# Patient Record
Sex: FEMALE | Race: WHITE | NOT HISPANIC OR LATINO | URBAN - METROPOLITAN AREA
[De-identification: names, ages, dates, MRNs, and addresses within clinical notes are randomized per-mention and may not be internally consistent; named-entity substitution may affect disease eponyms.]

---

## 2017-08-02 ENCOUNTER — IMPORTED ENCOUNTER (OUTPATIENT)
Dept: URBAN - METROPOLITAN AREA CLINIC 31 | Facility: CLINIC | Age: 77
End: 2017-08-02

## 2017-08-02 PROBLEM — H35.3131: Noted: 2017-08-02

## 2017-08-02 PROBLEM — H04.123: Noted: 2017-08-02

## 2017-08-02 PROBLEM — Z96.1: Noted: 2017-08-02

## 2017-08-02 PROBLEM — H18.51: Noted: 2017-08-02

## 2017-08-02 PROCEDURE — 92014 COMPRE OPH EXAM EST PT 1/>: CPT

## 2017-08-02 PROCEDURE — 92286 ANT SGM IMG I&R SPECLR MIC: CPT

## 2017-08-02 NOTE — PATIENT DISCUSSION
1.  Fuchs Dystrophy OU: Recommend clinical observation. Advised use of Yuval 128 drops in affected eye if worsening. 2. Pseudophakia OU - IOLs stable. Monitor. 3. ARMD OU dry - Importance of smoking cessation blood pressure control and healthy diet were emphasized. In accordance with the AREDS study a good multivitamin containing EC and Zinc were recommened to be taken daily. Patient was instructed to self monitor their monocular vision (reading/[employer] Grid) at least weekly. Patient should immediately report any new onset of decreased vision or metamorphopsia. 4. Dry Eyes OU:  Start artificials tears. Encouraged regular use. 5.  Return for an appointment in 12 months for comprehensive exam. ECC. with Dr. Tesha Rosa.

## 2018-02-02 ENCOUNTER — IMPORTED ENCOUNTER (OUTPATIENT)
Dept: URBAN - METROPOLITAN AREA CLINIC 31 | Facility: CLINIC | Age: 78
End: 2018-02-02

## 2018-02-02 PROBLEM — Z96.1: Noted: 2018-02-02

## 2018-02-02 PROBLEM — G43.B0: Noted: 2018-02-02

## 2018-02-02 PROBLEM — H18.51: Noted: 2018-02-02

## 2018-02-02 PROCEDURE — 99214 OFFICE O/P EST MOD 30 MIN: CPT

## 2018-02-02 NOTE — PATIENT DISCUSSION
1.  Opthalmic Migraine - Discussed opthalmic migraine auras and possibilty of avoiding triggers. Continue to follow with primary medical provider. 2. Pseudophakia OU - IOLs stable. Monitor. 3. Fuchs Dystrophy OU: Recommend clinical observation. Advised use of Yuval 128 drops in affected eye if worsening. 4. Return for an appointment in 6 months for comprehensive exam. with Dr. Lashanda Trinidad.

## 2018-08-10 ENCOUNTER — IMPORTED ENCOUNTER (OUTPATIENT)
Dept: URBAN - METROPOLITAN AREA CLINIC 31 | Facility: CLINIC | Age: 78
End: 2018-08-10

## 2018-08-10 PROBLEM — Z96.1: Noted: 2018-08-10

## 2018-08-10 PROBLEM — H16.143: Noted: 2018-08-10

## 2018-08-10 PROBLEM — H18.51: Noted: 2018-08-10

## 2018-08-10 PROBLEM — H04.123: Noted: 2018-08-10

## 2018-08-10 PROCEDURE — 92014 COMPRE OPH EXAM EST PT 1/>: CPT

## 2018-08-10 PROCEDURE — 92286 ANT SGM IMG I&R SPECLR MIC: CPT

## 2018-08-10 NOTE — PATIENT DISCUSSION
Fuchs Dystrophy OU: Recommend clinical observation. Cell count worse no clinical edema. T/C Sodium Chloride drops in future.

## 2018-08-10 NOTE — PATIENT DISCUSSION
1.  Pseudophakia OU - IOLs stable. Monitor. 2. Fuchs Dystrophy OU: Recommend clinical observation. Cell count worse no clinical edema. T/C Sodium Chloride drops in future. 3.  Dry Eye OU:  Continue current management with Artificial Tears. 4.  Return for an appointment in 12 months for comprehensive exam. with Dr. Obi Chaudhry.

## 2019-08-27 ENCOUNTER — IMPORTED ENCOUNTER (OUTPATIENT)
Dept: URBAN - METROPOLITAN AREA CLINIC 31 | Facility: CLINIC | Age: 79
End: 2019-08-27

## 2019-08-27 PROBLEM — H18.51: Noted: 2019-08-27

## 2019-08-27 PROBLEM — Z96.1: Noted: 2019-08-27

## 2019-08-27 PROBLEM — H04.123: Noted: 2019-08-27

## 2019-08-27 PROCEDURE — 92015 DETERMINE REFRACTIVE STATE: CPT

## 2019-08-27 PROCEDURE — 92014 COMPRE OPH EXAM EST PT 1/>: CPT

## 2019-08-27 NOTE — PATIENT DISCUSSION
1.  Dry Eye OU:  Continue current management with Artificial Tears. 2.  Pseudophakia OU - IOLs stable. Monitor. 3. Fuchs Dystrophy OU: Recommend clinical observation. Advised use of Yuval 128 drops in affected eye if worsening. Moving to [place]. Return for an appointment in 12 months for comprehensive exam. with Dr. Chris Espinoza.

## 2019-08-27 NOTE — PATIENT DISCUSSION
Fuchs Dystrophy OU: Recommend clinical observation. Advised use of Yuval 128 drops in affected eye if worsening.

## 2019-08-27 NOTE — PATIENT DISCUSSION
1.  Pseudophakia OU - IOLs stable. Monitor. 2. Fuchs Dystrophy OU: Recommend clinical observation. No clinical edema. T/C Sodium Chloride drops in future. 3.  Dry Eye OU:  Continue current management with Artificial Tears. 4.  Return for an appointment in 12 months for comprehensive exam.and ECC with Dr. Denver Curb.

## 2022-04-02 ASSESSMENT — VISUAL ACUITY
OS_CC: 20/40-2
OS_PH: SC 20/30 -2
OS_CC: 20/50
OD_SC: J7
OS_CC: 20/50
OS_CC: 20/40-2
OD_CC: J5
OD_CC: 20/30
OD_CC: 20/40
OU_SC: 20/50
OS_PH: SC 20/30
OD_CC: 20/40-1
OS_CC: J2
OS_PH: SC 20/40 +2
OD_CC: 20/30-2
OS_SC: J5

## 2022-04-02 ASSESSMENT — TONOMETRY
OD_IOP_MMHG: 12
OD_IOP_MMHG: 11
OS_IOP_MMHG: 15
OD_IOP_MMHG: 15
OD_IOP_MMHG: 15
OS_IOP_MMHG: 9
OS_IOP_MMHG: 14
OS_IOP_MMHG: 12

## 2022-06-17 NOTE — PATIENT DISCUSSION
Continue current management at this time. Seeing glaucoma specialist every 3 months. Continue present medication.

## 2022-07-09 ENCOUNTER — TELEPHONE ENCOUNTER (OUTPATIENT)
Dept: URBAN - METROPOLITAN AREA CLINIC 121 | Facility: CLINIC | Age: 82
End: 2022-07-09

## 2022-07-10 ENCOUNTER — TELEPHONE ENCOUNTER (OUTPATIENT)
Dept: URBAN - METROPOLITAN AREA CLINIC 121 | Facility: CLINIC | Age: 82
End: 2022-07-10